# Patient Record
Sex: MALE | Race: WHITE | NOT HISPANIC OR LATINO | Employment: FULL TIME | URBAN - METROPOLITAN AREA
[De-identification: names, ages, dates, MRNs, and addresses within clinical notes are randomized per-mention and may not be internally consistent; named-entity substitution may affect disease eponyms.]

---

## 2022-07-29 ENCOUNTER — OFFICE VISIT (OUTPATIENT)
Dept: URGENT CARE | Facility: CLINIC | Age: 52
End: 2022-07-29
Payer: COMMERCIAL

## 2022-07-29 VITALS
RESPIRATION RATE: 18 BRPM | SYSTOLIC BLOOD PRESSURE: 161 MMHG | HEART RATE: 60 BPM | TEMPERATURE: 97.3 F | DIASTOLIC BLOOD PRESSURE: 87 MMHG | OXYGEN SATURATION: 99 %

## 2022-07-29 DIAGNOSIS — S05.02XA ABRASION OF LEFT CONJUNCTIVA, INITIAL ENCOUNTER: Primary | ICD-10-CM

## 2022-07-29 PROCEDURE — 99213 OFFICE O/P EST LOW 20 MIN: CPT | Performed by: PHYSICIAN ASSISTANT

## 2022-07-29 RX ORDER — POLYMYXIN B SULFATE AND TRIMETHOPRIM 1; 10000 MG/ML; [USP'U]/ML
1 SOLUTION OPHTHALMIC EVERY 6 HOURS
Qty: 10 ML | Refills: 0 | Status: SHIPPED | OUTPATIENT
Start: 2022-07-29 | End: 2022-08-05

## 2022-07-29 RX ORDER — FLUOXETINE HYDROCHLORIDE 40 MG/1
40 CAPSULE ORAL DAILY
COMMUNITY

## 2022-07-29 RX ORDER — KETOROLAC TROMETHAMINE 5 MG/ML
1 SOLUTION OPHTHALMIC 4 TIMES DAILY PRN
Qty: 5 ML | Refills: 0 | Status: SHIPPED | OUTPATIENT
Start: 2022-07-29

## 2022-07-29 NOTE — PROGRESS NOTES
[facility]        NAME: Miguel Sigala is a 47 y o  male  : 1970    MRN: [id number]  DATE: 2022  TIME: 12:19 PM    Assessment and Plan   Abrasion of left conjunctiva, initial encounter [[id number]  02XA]  1  Abrasion of left conjunctiva, initial encounter  polymyxin b-trimethoprim (POLYTRIM) ophthalmic solution    ketorolac (ACULAR) 0 5 % ophthalmic solution         Patient Instructions     Use Polytrim and Acular drops as prescribed  Some pain will return after office administered drops wear off  Follow up with ophthalmologist within 24 hours  Wear eye protection during high-risk activities when appropriate  Tylenol/Ibuprofen for pain  Avoid eye patching  Follow up with PCP in 3-5 days  Proceed to  ER if symptoms worsen  Chief Complaint   No chief complaint on file  History of Present Illness       Eye Problem   The left eye is affected  This is a new problem  The current episode started today  Injury mechanism: working when felt something in his eye  The pain is at a severity of 4/10  He does not wear contacts  Pertinent negatives include no eye discharge, eye redness, fever, itching, nausea, photophobia or vomiting  Treatments tried: irrigation  Review of Systems   Review of Systems   Constitutional: Negative for chills and fever  HENT: Negative  Eyes: Positive for pain  Negative for photophobia, discharge, redness, itching and visual disturbance  Gastrointestinal: Negative for nausea and vomiting  Neurological: Negative for dizziness and headaches           Current Medications       Current Outpatient Medications:     FLUoxetine (PROzac) 40 MG capsule, Take 40 mg by mouth daily, Disp: , Rfl:     ketorolac (ACULAR) 0 5 % ophthalmic solution, Administer 1 drop into the left eye 4 (four) times a day as needed (pain), Disp: 5 mL, Rfl: 0    polymyxin b-trimethoprim (POLYTRIM) ophthalmic solution, Administer 1 drop into the left eye every 6 (six) hours for 7 days, Disp: 10 mL, Rfl: 0    Current Allergies     Allergies as of 07/29/2022    (No Known Allergies)            The following portions of the patient's history were reviewed and updated as appropriate: allergies, current medications, past family history, past medical history, past social history, past surgical history and problem list      Past Medical History:   Diagnosis Date    Anxiety        History reviewed  No pertinent surgical history  No family history on file  Medications have been verified  Objective   /87   Pulse 60   Temp (!) 97 3 °F (36 3 °C)   Resp 18   SpO2 99%   No LMP for male patient  Physical Exam     Physical Exam  Vitals reviewed  Constitutional:       General: He is not in acute distress  Appearance: He is well-developed  He is not diaphoretic  HENT:      Head: Normocephalic and atraumatic  Nose: Nose normal    Eyes:      General:         Right eye: No discharge  Left eye: No discharge  Extraocular Movements: Extraocular movements intact  Pupils: Pupils are equal, round, and reactive to light  Comments: L sclera injected  L eye 20/25; R 20/20; B/L 20/20   Cardiovascular:      Rate and Rhythm: Normal rate and regular rhythm  Heart sounds: Normal heart sounds  No murmur heard  No friction rub  No gallop  Pulmonary:      Effort: Pulmonary effort is normal  No respiratory distress  Breath sounds: Normal breath sounds  No wheezing, rhonchi or rales  Chest:      Chest wall: No tenderness  Skin:     General: Skin is warm  Neurological:      Mental Status: He is alert  Psychiatric:         Behavior: Behavior normal          Thought Content: Thought content normal          Judgment: Judgment normal        After patient consent was obtained, 1 drop of tetracaine and fluorescein stain was administered into L eye  Direct visualization was achieved with UV light with abrasion   L eyelids were everted without evidence of foreign body  L eye was irrigated with saline solution  Patient tolerated procedure without incident

## 2022-07-29 NOTE — PATIENT INSTRUCTIONS
Use Polytrim and Acular drops as prescribed  Some pain will return after office administered drops wear off  Follow up with ophthalmologist within 24 hours  Wear eye protection during high-risk activities when appropriate  Tylenol/Ibuprofen for pain  Avoid eye patching  Follow up with PCP in 3-5 days  Proceed to  ER if symptoms worsen

## 2023-08-21 ENCOUNTER — OFFICE VISIT (OUTPATIENT)
Dept: URGENT CARE | Facility: CLINIC | Age: 53
End: 2023-08-21
Payer: COMMERCIAL

## 2023-08-21 VITALS
TEMPERATURE: 97.5 F | HEART RATE: 76 BPM | OXYGEN SATURATION: 99 % | RESPIRATION RATE: 18 BRPM | SYSTOLIC BLOOD PRESSURE: 133 MMHG | DIASTOLIC BLOOD PRESSURE: 72 MMHG

## 2023-08-21 DIAGNOSIS — T63.441A BEE STING, ACCIDENTAL OR UNINTENTIONAL, INITIAL ENCOUNTER: Primary | ICD-10-CM

## 2023-08-21 PROCEDURE — 99213 OFFICE O/P EST LOW 20 MIN: CPT | Performed by: NURSE PRACTITIONER

## 2023-08-21 PROCEDURE — 96372 THER/PROPH/DIAG INJ SC/IM: CPT | Performed by: NURSE PRACTITIONER

## 2023-08-21 RX ORDER — EPINEPHRINE 0.3 MG/.3ML
0.3 INJECTION SUBCUTANEOUS ONCE
Qty: 0.6 ML | Refills: 0 | Status: SHIPPED | OUTPATIENT
Start: 2023-08-21 | End: 2023-08-21

## 2023-08-21 RX ORDER — METHYLPREDNISOLONE SODIUM SUCCINATE 125 MG/2ML
125 INJECTION, POWDER, LYOPHILIZED, FOR SOLUTION INTRAMUSCULAR; INTRAVENOUS ONCE
Status: COMPLETED | OUTPATIENT
Start: 2023-08-21 | End: 2023-08-21

## 2023-08-21 RX ORDER — PREDNISONE 20 MG/1
20 TABLET ORAL 2 TIMES DAILY WITH MEALS
Qty: 10 TABLET | Refills: 0 | Status: SHIPPED | OUTPATIENT
Start: 2023-08-21 | End: 2023-08-26

## 2023-08-21 RX ORDER — EPINEPHRINE 0.3 MG/.3ML
0.3 INJECTION SUBCUTANEOUS DAILY PRN
Qty: 0.6 ML | Refills: 1 | Status: SHIPPED | OUTPATIENT
Start: 2023-08-21

## 2023-08-21 RX ADMIN — METHYLPREDNISOLONE SODIUM SUCCINATE 125 MG: 125 INJECTION, POWDER, LYOPHILIZED, FOR SOLUTION INTRAMUSCULAR; INTRAVENOUS at 16:56

## 2023-08-21 NOTE — PROGRESS NOTES
[facility]        NAME: Nir Pierce is a 47 y.o. male  : 1970    MRN: [id number]  DATE: 2023  TIME: 5:25 PM      Assessment and Plan     Bee sting, accidental or unintentional, initial encounter [T63.441A]  1. Bee sting, accidental or unintentional, initial encounter  methylPREDNISolone sodium succinate (Solu-MEDROL) injection 125 mg    predniSONE 20 mg tablet    EPINEPHrine (EPIPEN) 0.3 mg/0.3 mL SOAJ    DISCONTINUED: EPINEPHrine (EPIPEN) 0.3 mg/0.3 mL SOAJ            Patient Instructions     Patient Instructions     Start the prednisone burst tomorrow. Take benadryl as needed in addition to the steroids. Your initial symptoms of nausea, face and lips swelling, etc that have resolved are signs of early anaphylaxis. Keep the epi pen with you since subsequent reactions can be worse. See information below. Insect Bite or Sting   AMBULATORY CARE:   Most insect bites and stings  are not dangerous and go away without treatment. Common examples of insects that bite or sting are bees, ticks, mosquitoes, spiders, and ants. Insect bites or stings can lead to diseases such as malaria, West Nile virus, Lyme disease, or [place] Spotted Fever. Common signs and symptoms:   • Mild symptoms  include a red bump, pain, swelling, and itching. • Anaphylaxis symptoms  include throat tightness, trouble breathing, tingling, dizziness, and wheezing. Anaphylaxis is a sudden, life-threatening reaction that needs immediate treatment. Call your local emergency number (911 in the [address]) for signs or symptoms of anaphylaxis,  such as trouble breathing, swelling in your mouth or throat, or wheezing. You may also have itching, a rash, hives, or feel like you are going to faint. Seek care immediately if:   • You are stung on your tongue or in your throat. • A white area forms around the bite. • You are sweating badly or have body pain.     • You think you were bitten or stung by a poisonous insect. Call your doctor if:   • You have a fever. • The area becomes red, warm, tender, and swollen beyond the area of the bite or sting. • You have questions or concerns about your condition or care. Steps to take for signs or symptoms of anaphylaxis:   • Immediately  give 1 shot of epinephrine only into the outer thigh muscle. • Leave the shot in place  as directed. Your healthcare provider may recommend you leave it in place for up to 10 seconds before you remove it. This helps make sure all of the epinephrine is delivered. • Call 911 and go to the emergency department,  even if the shot improved symptoms. Do not drive yourself. Bring the used epinephrine shot with you. Treatment  depends on how severe your symptoms are and if you had anaphylaxis before. You may need any of the following:  • Antihistamines  decrease mild symptoms such as itching and rash. • Epinephrine  is medicine used to treat severe allergic reactions such as anaphylaxis. • A tetanus shot  may be given. The shot is a vaccine that helps prevent a bacterial infection. Tetanus booster shots are usually given every 10 years. If an insect bites or stings you:   • Remove the stinger. Scrape the stinger out with your fingernail, edge of a credit card, or a knife blade. Do not squeeze the wound. Gently wash the area with soap and water. • Remove the tick. Ticks must be removed as soon as possible so you do not get diseases passed through tick bites. Ask your healthcare provider for more information on tick bites and how to remove ticks. Care for your bite or sting wound:   • Elevate (raise) the area above the level of your heart, if possible. Prop the area on pillows to keep it raised comfortably. Elevate the area for 10 to 20 minutes each hour or as directed by your healthcare provider. • Apply compresses. Soak a clean washcloth in cold water, wring it out, and put it on the bite or sting.  Use the compress for 10 to 20 minutes each hour or as directed by your healthcare provider. After 24 to 48 hours, change to warm compresses. • Apply a baking soda paste. Add water to baking soda to make a thick paste. Put the paste on the area for 5 minutes. Rinse gently to remove the paste. Safety precautions to take if you are at risk for anaphylaxis:   • Keep 2 shots of epinephrine with you at all times. You may need a second shot, because epinephrine only works for about 20 minutes and symptoms may return. Your healthcare provider can show you and family members how to give the shot. Check the expiration date every month and replace it before it expires. • Create an action plan. Your healthcare provider can help you create a written plan that explains the allergy and an emergency plan to treat a reaction. The plan explains when to give a second epinephrine shot if symptoms return or do not improve after the first. Give copies of the action plan and emergency instructions to family members, work and school staff, and  providers. Show them how to give a shot of epinephrine. • Carry medical alert identification. Wear medical alert jewelry or carry a card that says you have an insect allergy. Ask your healthcare provider where to get these items. Prevent an insect bite or sting:   • Do not wear bright-colored or flower-print clothing when you plan to spend time outdoors. Do not use hairspray, perfumes, or aftershave. • Do not leave food out. • Empty any standing water. Wash containers with soap and water every 2 days. • Put screens on all open windows and doors. • Put insect repellent that contains DEET on skin that is showing when you go outside. Put insect repellent at the top of your boots, bottom of pant legs, and sleeve cuffs. Wear long sleeves, pants, and shoes. • Use citronella candles outdoors to help keep mosquitoes away. Put a tick and flea collar on pets.     Follow up with your doctor as directed:  Write down your questions so you remember to ask them during your visits. © Copyright [employer] 2022 Information is for End User's use only and may not be sold, redistributed or otherwise used for commercial purposes. The above information is an  only. It is not intended as medical advice for individual conditions or treatments. Talk to your doctor, nurse or pharmacist before following any medical regimen to see if it is safe and effective for you. Anaphylaxis   AMBULATORY CARE:   Anaphylaxis  is a life-threatening allergic reaction that must be treated immediately. Your risk for anaphylaxis increases if you have asthma that is severe or not controlled. Medical conditions such as heart disease can also increase your risk. It is important to be prepared if you are at risk for anaphylaxis. Your symptoms can be worse each time you are exposed to the trigger. Steps to take for signs or symptoms of anaphylaxis:   • Immediately give 1 shot of epinephrine  only into the outer thigh muscle. Even if your allergic reaction seems mild, it can quickly become anaphylaxis. This may happen even if you had a mild reaction to the allergen in the past. Each exposure can cause a different reaction. Watch for signs and symptoms of anaphylaxis every time you are exposed to a trigger. Be ready to give a shot of epinephrine. It is okay to inject epinephrine through clothing. Just be careful to avoid seams, zippers, or other parts that can prevent the needle from entering your skin. • Leave the shot in place  as directed. Your healthcare provider may recommend you leave it in place for up to 10 seconds before you remove it. This helps make sure all of the epinephrine is delivered. • Call 911 and go to the emergency department,  even if the shot improved symptoms. Do not drive yourself. Bring the used epinephrine shot with you.     Call 911 for any of the following:   • You have a skin rash, hives, swelling, or itching. • You have trouble breathing, shortness of breath, wheezing, or coughing. • Your throat tightens or your lips or tongue swell. • You have difficulty swallowing or speaking. • You are dizzy, lightheaded, confused, or feel like you are going to faint. • You have nausea, diarrhea, or abdominal cramps, or you are vomiting. Seek care immediately if:   • Signs or symptoms of anaphylaxis return. Contact your healthcare provider if:   • You have questions or concerns about your condition or care. Common triggers: The following are some of the most common triggers:  • Milk, peanuts, tree nuts, eggs, shellfish, wheat, and soy     • Stings from bees, wasps, or fire ants     • Antibiotics, NSAIDs, or aspirin     • Latex     • Exercise following exposure to another trigger, such as after you eat a trigger food    Common signs and symptoms: You may have any of the following within seconds to hours after exposure to a trigger:  • Trouble breathing, shortness of breath, wheezing, or coughing     • Throat tightening, swelling of the lips or tongue, or trouble swallowing     • Rash, hives, swelling, or itching     • Nausea, vomiting, diarrhea, or abdominal cramps     • Dizziness, lightheadedness, fainting, or confusion     • Sudden behavior changes or irritability    Treatment  will depend on how severe your reaction was and if you had a reaction before. You may need any of the following:  • Epinephrine  is medicine used to treat severe allergic reactions such as anaphylaxis. It is given as a shot into the outer thigh muscle. • Medicines  such as antihistamines, steroids, and bronchodilators decrease inflammation, open airways, and make breathing easier. • Oxygen  may be needed if your blood oxygen level is lower than it should be. You may get oxygen through a mask placed over your nose and mouth or through small tubes placed in your nostrils.     Safety precautions:   • Keep 2 shots of epinephrine with you at all times. You may need a second shot, because epinephrine only works for about 20 minutes and symptoms may return. Your healthcare provider can show you and family members how to give the shot. Check the expiration date every month and replace it before it expires. • Create an action plan. Your healthcare provider can help you create a written plan that explains the allergy and an emergency plan to treat a reaction. The plan explains when to give a second epinephrine shot if symptoms return or do not improve after the first. Give copies of the action plan and emergency instructions to family members, and work or school staff. Show them how to give a shot of epinephrine in case you are not able to give it to yourself. • Be careful when you exercise. If you have had exercise-induced anaphylaxis, do not exercise right after you eat. Stop exercising right away if you start to develop any signs or symptoms of anaphylaxis. You may first feel tired, warm, or have itchy skin. Hives, swelling, and severe breathing problems may develop if you continue to exercise. • Carry medical alert identification. Wear medical alert jewelry or carry a card that explains the allergy. Ask your healthcare provider where to get these items. • Identify and avoid known triggers. Read food labels for ingredients. Look for triggers in your environment. • Ask about treatments to prevent anaphylaxis. You may need allergy shots or other medicines to treat allergies. Follow up with your healthcare provider as directed: Allergy testing may reveal allergies that can trigger anaphylaxis. Write down your questions so you remember to ask them during your visits. © Copyright formerly [facility] 2022 Information is for End User's use only and may not be sold, redistributed or otherwise used for commercial purposes. The above information is an  only.  It is not intended as medical advice for individual conditions or treatments. Talk to your doctor, nurse or pharmacist before following any medical regimen to see if it is safe and effective for you. Follow up with PCP in 3-5 days. Proceed to  ER if symptoms worsen. Chief Complaint     Chief Complaint   Patient presents with   • Insect Bite     Left ankle when cutting grass, developed hives, but are clearing up          History of Present Illness     Patient presents accompanied by wife. He was stung by bee or other flying insect while cutting grass. Has localized left ankle swelling and pain, but has hives all over and initially had sensation of face and lips being swollen with a wave of nausea. No meds taken--the swelling and nausea resolved on their own. He notes his aunt is allergic to bees. We discussed that you can be allergic to 1 type but not all (bee, wasp, yellow jacket, etc) but the concern is that subsequent reactions could be more severe--discussed epi pen. Notes he has been stung before without this reaction--new allergy vs different type of sting discussed. Review of Systems     Review of Systems   HENT: Positive for facial swelling (sensation initially with lip swelling; these have resolved). Gastrointestinal: Positive for nausea (resolved now). Musculoskeletal: Positive for arthralgias and joint swelling (at site of left ankle bee sting). Skin: Positive for rash (hives all over--present but improved now). All other systems reviewed and are negative.         Current Medications       Current Outpatient Medications:   •  EPINEPHrine (EPIPEN) 0.3 mg/0.3 mL SOAJ, Inject 0.3 mL (0.3 mg total) into a muscle daily as needed for anaphylaxis, Disp: 0.6 mL, Rfl: 1  •  FLUoxetine (PROzac) 40 MG capsule, Take 40 mg by mouth daily, Disp: , Rfl:   •  predniSONE 20 mg tablet, Take 1 tablet (20 mg total) by mouth 2 (two) times a day with meals for 5 days, Disp: 10 tablet, Rfl: 0  No current facility-administered medications for this visit. Current Allergies     Allergies as of 08/21/2023 - Reviewed 08/21/2023   Allergen Reaction Noted   • Bee venom Anaphylaxis and Hives 08/21/2023              The following portions of the patient's history were reviewed and updated as appropriate: allergies, current medications, past family history, past medical history, past social history, past surgical history and problem list.     Past Medical History:   Diagnosis Date   • Anxiety        History reviewed. No pertinent surgical history. History reviewed. No pertinent family history. Medications have been verified. Objective     /72   Pulse 76   Temp 97.5 °F (36.4 °C)   Resp 18   SpO2 99%   No LMP for male patient. Physical Exam     Physical Exam  Vitals and nursing note reviewed. Constitutional:       General: He is not in acute distress. Appearance: Normal appearance. He is well-developed. He is not ill-appearing, toxic-appearing or diaphoretic. HENT:      Head: Normocephalic and atraumatic. Eyes:      Pupils: Pupils are equal, round, and reactive to light. Pulmonary:      Effort: Pulmonary effort is normal. No respiratory distress. Abdominal:      General: There is no distension. Palpations: Abdomen is soft. Musculoskeletal:         General: Normal range of motion. Cervical back: Normal range of motion and neck supple. Left ankle: Swelling present. Tenderness present. Legs:    Skin:     General: Skin is warm and dry. Capillary Refill: Capillary refill takes less than 2 seconds. Neurological:      General: No focal deficit present. Mental Status: He is alert and oriented to person, place, and time. Psychiatric:         Mood and Affect: Mood normal.         Behavior: Behavior normal.         Thought Content:  Thought content normal.         Judgment: Judgment normal.

## 2023-08-21 NOTE — PATIENT INSTRUCTIONS
Start the prednisone burst tomorrow. Take benadryl as needed in addition to the steroids. Your initial symptoms of nausea, face and lips swelling, etc that have resolved are signs of early anaphylaxis. Keep the epi pen with you since subsequent reactions can be worse. See information below. Insect Bite or Sting   AMBULATORY CARE:   Most insect bites and stings  are not dangerous and go away without treatment. Common examples of insects that bite or sting are bees, ticks, mosquitoes, spiders, and ants. Insect bites or stings can lead to diseases such as malaria, West Nile virus, Lyme disease, or [place] Spotted Fever. Common signs and symptoms:   Mild symptoms  include a red bump, pain, swelling, and itching. Anaphylaxis symptoms  include throat tightness, trouble breathing, tingling, dizziness, and wheezing. Anaphylaxis is a sudden, life-threatening reaction that needs immediate treatment. Call your local emergency number (911 in the [address]) for signs or symptoms of anaphylaxis,  such as trouble breathing, swelling in your mouth or throat, or wheezing. You may also have itching, a rash, hives, or feel like you are going to faint. Seek care immediately if:   You are stung on your tongue or in your throat. A white area forms around the bite. You are sweating badly or have body pain. You think you were bitten or stung by a poisonous insect. Call your doctor if:   You have a fever. The area becomes red, warm, tender, and swollen beyond the area of the bite or sting. You have questions or concerns about your condition or care. Steps to take for signs or symptoms of anaphylaxis:   Immediately  give 1 shot of epinephrine only into the outer thigh muscle. Leave the shot in place  as directed. Your healthcare provider may recommend you leave it in place for up to 10 seconds before you remove it. This helps make sure all of the epinephrine is delivered.     Call 911 and go to the emergency department,  even if the shot improved symptoms. Do not drive yourself. Bring the used epinephrine shot with you. Treatment  depends on how severe your symptoms are and if you had anaphylaxis before. You may need any of the following:  Antihistamines  decrease mild symptoms such as itching and rash. Epinephrine  is medicine used to treat severe allergic reactions such as anaphylaxis. A tetanus shot  may be given. The shot is a vaccine that helps prevent a bacterial infection. Tetanus booster shots are usually given every 10 years. If an insect bites or stings you:   Remove the stinger. Scrape the stinger out with your fingernail, edge of a credit card, or a knife blade. Do not squeeze the wound. Gently wash the area with soap and water. Remove the tick. Ticks must be removed as soon as possible so you do not get diseases passed through tick bites. Ask your healthcare provider for more information on tick bites and how to remove ticks. Care for your bite or sting wound:   Elevate (raise) the area above the level of your heart, if possible. Prop the area on pillows to keep it raised comfortably. Elevate the area for 10 to 20 minutes each hour or as directed by your healthcare provider. Apply compresses. Soak a clean washcloth in cold water, wring it out, and put it on the bite or sting. Use the compress for 10 to 20 minutes each hour or as directed by your healthcare provider. After 24 to 48 hours, change to warm compresses. Apply a baking soda paste. Add water to baking soda to make a thick paste. Put the paste on the area for 5 minutes. Rinse gently to remove the paste. Safety precautions to take if you are at risk for anaphylaxis:   Keep 2 shots of epinephrine with you at all times. You may need a second shot, because epinephrine only works for about 20 minutes and symptoms may return. Your healthcare provider can show you and family members how to give the shot. Check the expiration date every month and replace it before it expires. Create an action plan. Your healthcare provider can help you create a written plan that explains the allergy and an emergency plan to treat a reaction. The plan explains when to give a second epinephrine shot if symptoms return or do not improve after the first. Give copies of the action plan and emergency instructions to family members, work and school staff, and  providers. Show them how to give a shot of epinephrine. Carry medical alert identification. Wear medical alert jewelry or carry a card that says you have an insect allergy. Ask your healthcare provider where to get these items. Prevent an insect bite or sting:   Do not wear bright-colored or flower-print clothing when you plan to spend time outdoors. Do not use hairspray, perfumes, or aftershave. Do not leave food out. Empty any standing water. Wash containers with soap and water every 2 days. Put screens on all open windows and doors. Put insect repellent that contains DEET on skin that is showing when you go outside. Put insect repellent at the top of your boots, bottom of pant legs, and sleeve cuffs. Wear long sleeves, pants, and shoes. Use citronella candles outdoors to help keep mosquitoes away. Put a tick and flea collar on pets. Follow up with your doctor as directed:  Write down your questions so you remember to ask them during your visits. © Copyright Fredy Tate 2022 Information is for End User's use only and may not be sold, redistributed or otherwise used for commercial purposes. The above information is an  only. It is not intended as medical advice for individual conditions or treatments. Talk to your doctor, nurse or pharmacist before following any medical regimen to see if it is safe and effective for you.   Anaphylaxis   AMBULATORY CARE:   Anaphylaxis  is a life-threatening allergic reaction that must be treated immediately. Your risk for anaphylaxis increases if you have asthma that is severe or not controlled. Medical conditions such as heart disease can also increase your risk. It is important to be prepared if you are at risk for anaphylaxis. Your symptoms can be worse each time you are exposed to the trigger. Steps to take for signs or symptoms of anaphylaxis:   Immediately give 1 shot of epinephrine  only into the outer thigh muscle. Even if your allergic reaction seems mild, it can quickly become anaphylaxis. This may happen even if you had a mild reaction to the allergen in the past. Each exposure can cause a different reaction. Watch for signs and symptoms of anaphylaxis every time you are exposed to a trigger. Be ready to give a shot of epinephrine. It is okay to inject epinephrine through clothing. Just be careful to avoid seams, zippers, or other parts that can prevent the needle from entering your skin. Leave the shot in place  as directed. Your healthcare provider may recommend you leave it in place for up to 10 seconds before you remove it. This helps make sure all of the epinephrine is delivered. Call 911 and go to the emergency department,  even if the shot improved symptoms. Do not drive yourself. Bring the used epinephrine shot with you. Call 911 for any of the following: You have a skin rash, hives, swelling, or itching. You have trouble breathing, shortness of breath, wheezing, or coughing. Your throat tightens or your lips or tongue swell. You have difficulty swallowing or speaking. You are dizzy, lightheaded, confused, or feel like you are going to faint. You have nausea, diarrhea, or abdominal cramps, or you are vomiting. Seek care immediately if:   Signs or symptoms of anaphylaxis return. Contact your healthcare provider if:   You have questions or concerns about your condition or care. Common triggers:   The following are some of the most common triggers:  Milk, peanuts, tree nuts, eggs, shellfish, wheat, and soy     Stings from bees, wasps, or fire ants     Antibiotics, NSAIDs, or aspirin     Latex     Exercise following exposure to another trigger, such as after you eat a trigger food    Common signs and symptoms: You may have any of the following within seconds to hours after exposure to a trigger:  Trouble breathing, shortness of breath, wheezing, or coughing     Throat tightening, swelling of the lips or tongue, or trouble swallowing     Rash, hives, swelling, or itching     Nausea, vomiting, diarrhea, or abdominal cramps     Dizziness, lightheadedness, fainting, or confusion     Sudden behavior changes or irritability    Treatment  will depend on how severe your reaction was and if you had a reaction before. You may need any of the following:  Epinephrine  is medicine used to treat severe allergic reactions such as anaphylaxis. It is given as a shot into the outer thigh muscle. Medicines  such as antihistamines, steroids, and bronchodilators decrease inflammation, open airways, and make breathing easier. Oxygen  may be needed if your blood oxygen level is lower than it should be. You may get oxygen through a mask placed over your nose and mouth or through small tubes placed in your nostrils. Safety precautions:   Keep 2 shots of epinephrine with you at all times. You may need a second shot, because epinephrine only works for about 20 minutes and symptoms may return. Your healthcare provider can show you and family members how to give the shot. Check the expiration date every month and replace it before it expires. Create an action plan. Your healthcare provider can help you create a written plan that explains the allergy and an emergency plan to treat a reaction.  The plan explains when to give a second epinephrine shot if symptoms return or do not improve after the first. Give copies of the action plan and emergency instructions to family members, and work or school staff. Show them how to give a shot of epinephrine in case you are not able to give it to yourself. Be careful when you exercise. If you have had exercise-induced anaphylaxis, do not exercise right after you eat. Stop exercising right away if you start to develop any signs or symptoms of anaphylaxis. You may first feel tired, warm, or have itchy skin. Hives, swelling, and severe breathing problems may develop if you continue to exercise. Carry medical alert identification. Wear medical alert jewelry or carry a card that explains the allergy. Ask your healthcare provider where to get these items. Identify and avoid known triggers. Read food labels for ingredients. Look for triggers in your environment. Ask about treatments to prevent anaphylaxis. You may need allergy shots or other medicines to treat allergies. Follow up with your healthcare provider as directed: Allergy testing may reveal allergies that can trigger anaphylaxis. Write down your questions so you remember to ask them during your visits. © Copyright Kali Turner 2022 Information is for End User's use only and may not be sold, redistributed or otherwise used for commercial purposes. The above information is an  only. It is not intended as medical advice for individual conditions or treatments. Talk to your doctor, nurse or pharmacist before following any medical regimen to see if it is safe and effective for you.

## 2024-05-25 ENCOUNTER — OCCMED (OUTPATIENT)
Dept: URGENT CARE | Facility: CLINIC | Age: 54
End: 2024-05-25
Payer: OTHER MISCELLANEOUS

## 2024-05-25 ENCOUNTER — APPOINTMENT (OUTPATIENT)
Dept: RADIOLOGY | Facility: CLINIC | Age: 54
End: 2024-05-25
Payer: OTHER MISCELLANEOUS

## 2024-05-25 DIAGNOSIS — M79.671 RIGHT FOOT PAIN: ICD-10-CM

## 2024-05-25 DIAGNOSIS — M79.671 RIGHT FOOT PAIN: Primary | ICD-10-CM

## 2024-05-25 PROCEDURE — 73630 X-RAY EXAM OF FOOT: CPT

## 2024-05-25 PROCEDURE — G0383 LEV 4 HOSP TYPE B ED VISIT: HCPCS | Performed by: PHYSICIAN ASSISTANT

## 2024-05-25 PROCEDURE — 99284 EMERGENCY DEPT VISIT MOD MDM: CPT | Performed by: PHYSICIAN ASSISTANT

## 2024-05-28 ENCOUNTER — APPOINTMENT (OUTPATIENT)
Dept: URGENT CARE | Facility: CLINIC | Age: 54
End: 2024-05-28
Payer: OTHER MISCELLANEOUS

## 2024-05-28 PROCEDURE — 99213 OFFICE O/P EST LOW 20 MIN: CPT | Performed by: PHYSICIAN ASSISTANT

## 2024-05-29 ENCOUNTER — APPOINTMENT (OUTPATIENT)
Dept: URGENT CARE | Facility: CLINIC | Age: 54
End: 2024-05-29
Payer: OTHER MISCELLANEOUS

## 2024-05-29 PROCEDURE — 99213 OFFICE O/P EST LOW 20 MIN: CPT | Performed by: PHYSICIAN ASSISTANT

## 2024-06-07 ENCOUNTER — APPOINTMENT (OUTPATIENT)
Dept: URGENT CARE | Facility: CLINIC | Age: 54
End: 2024-06-07
Payer: OTHER MISCELLANEOUS

## 2024-06-07 PROCEDURE — 99213 OFFICE O/P EST LOW 20 MIN: CPT | Performed by: PHYSICIAN ASSISTANT

## 2024-08-03 ENCOUNTER — OFFICE VISIT (OUTPATIENT)
Dept: URGENT CARE | Facility: CLINIC | Age: 54
End: 2024-08-03
Payer: COMMERCIAL

## 2024-08-03 VITALS
OXYGEN SATURATION: 97 % | DIASTOLIC BLOOD PRESSURE: 76 MMHG | TEMPERATURE: 97.7 F | RESPIRATION RATE: 18 BRPM | WEIGHT: 159.6 LBS | BODY MASS INDEX: 22.85 KG/M2 | SYSTOLIC BLOOD PRESSURE: 114 MMHG | HEIGHT: 70 IN | HEART RATE: 94 BPM

## 2024-08-03 DIAGNOSIS — J01.10 ACUTE FRONTAL SINUSITIS, RECURRENCE NOT SPECIFIED: Primary | ICD-10-CM

## 2024-08-03 PROCEDURE — S9083 URGENT CARE CENTER GLOBAL: HCPCS | Performed by: FAMILY MEDICINE

## 2024-08-03 PROCEDURE — G0382 LEV 3 HOSP TYPE B ED VISIT: HCPCS | Performed by: FAMILY MEDICINE

## 2024-08-03 RX ORDER — AMOXICILLIN 875 MG/1
875 TABLET, COATED ORAL 2 TIMES DAILY
Qty: 14 TABLET | Refills: 0 | Status: SHIPPED | OUTPATIENT
Start: 2024-08-03 | End: 2024-08-10

## 2024-08-03 NOTE — PROGRESS NOTES
[facility]        NAME: Parrish Jensen is a 54 y.o. male  : 1970    MRN: [id number]  DATE: August 3, 2024  TIME: 8:26 AM    Assessment and Plan   Acute frontal sinusitis, recurrence not specified [J01.10]  1. Acute frontal sinusitis, recurrence not specified  amoxicillin (AMOXIL) 875 mg tablet            Patient Instructions     Patient Instructions   Take antibiotic as instructed.  Continue supportive care.  All patient's questions answered and is agreeable with this plan.      Follow up with PCP in 3-5 days.  Proceed to  ER if symptoms worsen.    Chief Complaint     Chief Complaint   Patient presents with    Cold Like Symptoms     Patient c/o sinus pain and pressure, post nasal drip, ear pain, and cough that started 8 days ago.           History of Present Illness       Patient is a 54-year-old male presenting today with cold-like symptoms x 10 days.  Patient's over the last several days he has had some nasal congestion, sinus pressure, dental pain and postnasal drip, has tried several different over-the-counter cough and cold medications with no real change or improvement of his symptoms.  Denies any known sick contacts.  Denies fever, chills, chest tightness, SOB.        Review of Systems   Review of Systems   Constitutional:  Negative for chills and fever.   HENT:  Positive for congestion, postnasal drip, sinus pressure and sinus pain. Negative for ear pain and sore throat.    Eyes:  Negative for pain and visual disturbance.   Respiratory:  Negative for cough and shortness of breath.    Cardiovascular:  Negative for chest pain and palpitations.   Gastrointestinal:  Negative for abdominal pain and vomiting.   Genitourinary:  Negative for dysuria and hematuria.   Musculoskeletal:  Negative for arthralgias and back pain.   Skin:  Negative for color change and rash.   Neurological:  Negative for seizures and syncope.   All other systems reviewed and are negative.        Current Medications  "      Current Outpatient Medications:     amoxicillin (AMOXIL) 875 mg tablet, Take 1 tablet (875 mg total) by mouth 2 (two) times a day for 7 days, Disp: 14 tablet, Rfl: 0    FLUoxetine (PROzac) 40 MG capsule, Take 40 mg by mouth daily, Disp: , Rfl:     EPINEPHrine (EPIPEN) 0.3 mg/0.3 mL SOAJ, Inject 0.3 mL (0.3 mg total) into a muscle daily as needed for anaphylaxis (Patient not taking: Reported on 8/3/2024), Disp: 0.6 mL, Rfl: 1    Current Allergies     Allergies as of 08/03/2024 - Reviewed 08/03/2024   Allergen Reaction Noted    Bee venom Anaphylaxis and Hives 08/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     Past Medical History:   Diagnosis Date    Anxiety        History reviewed. No pertinent surgical history.    No family history on file.      Medications have been verified.        Objective   /76   Pulse 94   Temp 97.7 °F (36.5 °C) (Temporal)   Resp 18   Ht 5' 10\" (1.778 m)   Wt 72.4 kg (159 lb 9.6 oz)   SpO2 97%   BMI 22.90 kg/m²        Physical Exam     Physical Exam  Vitals reviewed.   Constitutional:       General: He is not in acute distress.     Appearance: He is well-developed. He is not toxic-appearing.   HENT:      Head: Normocephalic and atraumatic.      Right Ear: Tympanic membrane, ear canal and external ear normal.      Left Ear: Tympanic membrane, ear canal and external ear normal.      Nose: Congestion present.      Right Sinus: Frontal sinus tenderness present.      Left Sinus: Frontal sinus tenderness present.      Mouth/Throat:      Mouth: Mucous membranes are moist.      Pharynx: Oropharynx is clear. No oropharyngeal exudate or posterior oropharyngeal erythema.   Eyes:      Conjunctiva/sclera: Conjunctivae normal.   Cardiovascular:      Rate and Rhythm: Normal rate and regular " rhythm.      Pulses: Normal pulses.      Heart sounds: Normal heart sounds.   Pulmonary:      Effort: Pulmonary effort is normal.      Breath sounds: Normal breath sounds.   Musculoskeletal:      Cervical back: Normal range of motion. No tenderness.   Lymphadenopathy:      Cervical: No cervical adenopathy.   Skin:     General: Skin is warm.      Capillary Refill: Capillary refill takes less than 2 seconds.   Neurological:      General: No focal deficit present.      Mental Status: He is alert and oriented to person, place, and time.

## 2024-08-03 NOTE — PATIENT INSTRUCTIONS
Take antibiotic as instructed.  Continue supportive care.  All patient's questions answered and is agreeable with this plan.